# Patient Record
Sex: MALE | Race: BLACK OR AFRICAN AMERICAN | NOT HISPANIC OR LATINO | Employment: FULL TIME | URBAN - METROPOLITAN AREA
[De-identification: names, ages, dates, MRNs, and addresses within clinical notes are randomized per-mention and may not be internally consistent; named-entity substitution may affect disease eponyms.]

---

## 2019-10-23 ENCOUNTER — OFFICE VISIT (OUTPATIENT)
Dept: GASTROENTEROLOGY | Facility: CLINIC | Age: 44
End: 2019-10-23

## 2019-10-23 ENCOUNTER — LAB (OUTPATIENT)
Dept: LAB | Facility: HOSPITAL | Age: 44
End: 2019-10-23

## 2019-10-23 VITALS
HEIGHT: 72 IN | WEIGHT: 309 LBS | RESPIRATION RATE: 16 BRPM | HEART RATE: 92 BPM | OXYGEN SATURATION: 98 % | SYSTOLIC BLOOD PRESSURE: 128 MMHG | BODY MASS INDEX: 41.85 KG/M2 | DIASTOLIC BLOOD PRESSURE: 92 MMHG | TEMPERATURE: 97.4 F

## 2019-10-23 DIAGNOSIS — Z12.11 SCREENING FOR COLON CANCER: Primary | ICD-10-CM

## 2019-10-23 DIAGNOSIS — R19.4 CHANGE IN BOWEL HABIT: Primary | ICD-10-CM

## 2019-10-23 DIAGNOSIS — R19.5 MUCUS IN STOOL: ICD-10-CM

## 2019-10-23 DIAGNOSIS — E66.01 OBESITY, CLASS III, BMI 40-49.9 (MORBID OBESITY) (HCC): ICD-10-CM

## 2019-10-23 DIAGNOSIS — R10.9 ABDOMINAL CRAMPING: ICD-10-CM

## 2019-10-23 DIAGNOSIS — K90.9 STEATORRHEA: ICD-10-CM

## 2019-10-23 DIAGNOSIS — K51.918 ULCERATIVE COLITIS WITH OTHER COMPLICATION, UNSPECIFIED LOCATION (HCC): ICD-10-CM

## 2019-10-23 LAB
ALBUMIN SERPL-MCNC: 4.6 G/DL (ref 3.5–5.2)
ALBUMIN/GLOB SERPL: 1.4 G/DL
ALP SERPL-CCNC: 64 U/L (ref 39–117)
ALT SERPL W P-5'-P-CCNC: 39 U/L (ref 1–41)
ANION GAP SERPL CALCULATED.3IONS-SCNC: 11 MMOL/L (ref 5–15)
AST SERPL-CCNC: 24 U/L (ref 1–40)
BASOPHILS # BLD AUTO: 0.07 10*3/MM3 (ref 0–0.2)
BASOPHILS NFR BLD AUTO: 0.9 % (ref 0–1.5)
BILIRUB SERPL-MCNC: 0.4 MG/DL (ref 0.2–1.2)
BUN BLD-MCNC: 12 MG/DL (ref 6–20)
BUN/CREAT SERPL: 13.3 (ref 7–25)
CALCIUM SPEC-SCNC: 9.2 MG/DL (ref 8.6–10.5)
CHLORIDE SERPL-SCNC: 101 MMOL/L (ref 98–107)
CO2 SERPL-SCNC: 24 MMOL/L (ref 22–29)
CREAT BLD-MCNC: 0.9 MG/DL (ref 0.76–1.27)
CRP SERPL-MCNC: 0.08 MG/DL (ref 0–0.5)
DEPRECATED RDW RBC AUTO: 41.9 FL (ref 37–54)
EOSINOPHIL # BLD AUTO: 0.47 10*3/MM3 (ref 0–0.4)
EOSINOPHIL NFR BLD AUTO: 5.9 % (ref 0.3–6.2)
ERYTHROCYTE [DISTWIDTH] IN BLOOD BY AUTOMATED COUNT: 13.1 % (ref 12.3–15.4)
FERRITIN SERPL-MCNC: 107 NG/ML (ref 30–400)
GFR SERPL CREATININE-BSD FRML MDRD: 110 ML/MIN/1.73
GLOBULIN UR ELPH-MCNC: 3.3 GM/DL
GLUCOSE BLD-MCNC: 139 MG/DL (ref 65–99)
HCT VFR BLD AUTO: 49.2 % (ref 37.5–51)
HGB BLD-MCNC: 16.2 G/DL (ref 13–17.7)
IMM GRANULOCYTES # BLD AUTO: 0.04 10*3/MM3 (ref 0–0.05)
IMM GRANULOCYTES NFR BLD AUTO: 0.5 % (ref 0–0.5)
IRON 24H UR-MRATE: 81 MCG/DL (ref 59–158)
IRON SATN MFR SERPL: 18 % (ref 20–50)
LYMPHOCYTES # BLD AUTO: 3.52 10*3/MM3 (ref 0.7–3.1)
LYMPHOCYTES NFR BLD AUTO: 44.6 % (ref 19.6–45.3)
MCH RBC QN AUTO: 28.8 PG (ref 26.6–33)
MCHC RBC AUTO-ENTMCNC: 32.9 G/DL (ref 31.5–35.7)
MCV RBC AUTO: 87.4 FL (ref 79–97)
MONOCYTES # BLD AUTO: 0.52 10*3/MM3 (ref 0.1–0.9)
MONOCYTES NFR BLD AUTO: 6.6 % (ref 5–12)
NEUTROPHILS # BLD AUTO: 3.28 10*3/MM3 (ref 1.7–7)
NEUTROPHILS NFR BLD AUTO: 41.5 % (ref 42.7–76)
NRBC BLD AUTO-RTO: 0 /100 WBC (ref 0–0.2)
PLATELET # BLD AUTO: 272 10*3/MM3 (ref 140–450)
PMV BLD AUTO: 10.9 FL (ref 6–12)
POTASSIUM BLD-SCNC: 4.3 MMOL/L (ref 3.5–5.2)
PROT SERPL-MCNC: 7.9 G/DL (ref 6–8.5)
RBC # BLD AUTO: 5.63 10*6/MM3 (ref 4.14–5.8)
SODIUM BLD-SCNC: 136 MMOL/L (ref 136–145)
TIBC SERPL-MCNC: 446 MCG/DL (ref 298–536)
TRANSFERRIN SERPL-MCNC: 299 MG/DL (ref 200–360)
VIT B12 BLD-MCNC: 621 PG/ML (ref 211–946)
WBC NRBC COR # BLD: 7.9 10*3/MM3 (ref 3.4–10.8)

## 2019-10-23 PROCEDURE — 80053 COMPREHEN METABOLIC PANEL: CPT

## 2019-10-23 PROCEDURE — 82652 VIT D 1 25-DIHYDROXY: CPT

## 2019-10-23 PROCEDURE — 84466 ASSAY OF TRANSFERRIN: CPT

## 2019-10-23 PROCEDURE — 86140 C-REACTIVE PROTEIN: CPT

## 2019-10-23 PROCEDURE — 82607 VITAMIN B-12: CPT

## 2019-10-23 PROCEDURE — 82728 ASSAY OF FERRITIN: CPT

## 2019-10-23 PROCEDURE — 36415 COLL VENOUS BLD VENIPUNCTURE: CPT

## 2019-10-23 PROCEDURE — 99244 OFF/OP CNSLTJ NEW/EST MOD 40: CPT | Performed by: NURSE PRACTITIONER

## 2019-10-23 PROCEDURE — 83540 ASSAY OF IRON: CPT

## 2019-10-23 PROCEDURE — 85025 COMPLETE CBC W/AUTO DIFF WBC: CPT

## 2019-10-23 NOTE — PROGRESS NOTES
"    GASTROENTEROLOGY OUTPATIENT NEW PATIENT NOTE  Patient: AXEL RAMOS : 1975  Date of Service: 10/23/2019  Dear , Paco Anderson MD   Thank you for your referral of this patient for evaluation of UC  CC: Diarrhea; Abdominal Cramping; and Ulcerative Colitis  Assessment/Plan                                             ASSESSMENT & PLANS     Change in bowel habit r/t IBD vs microscopic colitis vs infectious diarrhea vs IBS-D   Steatorrhea   Mucus in stool   Abdominal cramping  -     CBC, CMP, CRP, Iron Profile, B12, Vit D  -     Calprotectin, Fecal, Pancreatic Elastase, Fecal; CDiff PCR   -     Colonoscopy w/ Dr Phillips    Hx of ulcerative colitis per pt. Previously tx w/ PRN Asacol  Hx of epiploic appendagitis  Obesity, Class III, BMI 40-49.9 (morbid obesity) (CMS/[facility])    Follow Up:Return in about 1 month (around 2019) for Recheck.      DISCUSSION: The above plan was delineated in details with patient and all questions and concerns were answered.  Patient is also given contact information.  Patient is to return as scheduled or sooner if new problems arise  Subjective                                                     SUBJECTIVE   History of Present Illness  Mr. Axel Ramos is a 44 y.o. male, former pt of Dr Phillips's, who presents to the clinic today for an evaluation of Diarrhea; Abdominal Cramping.  Pt was last seen by Dr Phillips over 7 yrs ago in .   Hx of UC and tx w/ PRN Asacol x 1-2 wks for flare then sx would resolve. Last time of Asacol was over a year ago. When not \"flaring,\" his baseline BM about [place] 4. Dr Phillips was pt's only GI doc.  Asacol rx given by PCP.   Most recent flare started 3 days ago on .  This flare is more severe, compared to the others. Pt has lot of cramping. RLQ usually, but sometimes both lower quadrants.  +++Bowel urgency/  +++Fatty and mucusy stools. BM 5-10 times w/ [place] 6-7 both day the night time. Pt denies dark black stools or bright red blood in " the stools, in the toilet bowl, or on the toilet tissue.   He has not taken Asacol. Hx of epiploic appendagitis  Occasional NSAIDS. Gained 40 lbs w/in a yr. Admits of bad eating habit  Pt denies consuming any suspected food or unpurified water.  No one w/ whom pt has shared a meal has developed diarrhea or exposed to sick contact.    No frequent abx. Pt was not ill or hospitalized in the months leading up to diarrheal illness. No recent travel outside of the [place].   Colonoscopy, done on 8/17/12 by Dr Phillips, showed hemorrhoids, but normal colonic mucosa to the terminal ileum. Random bx WNL and no IBD    ROS:Review of Systems   Constitutional: Negative.         Pt currently or recently takes NSAIDS ( (i.e Ibuprofen, Aleve, Advil, Exedrin, BC Powder, diclofenac, meloxicam, & Naproxen, etc)?  No    Pt currently or recently takes abx  No   HENT: Negative.    Cardiovascular: Negative.    Gastrointestinal: Positive for abdominal pain (Cramping) and diarrhea.   All other systems reviewed and are negative.    Subjective   PAST MED HX: Pt  has a past medical history of Ulcerative colitis (CMS/HCC). Hx of kidney stones. No UTI  PAST SURG HX: Pt  has a past surgical history that includes Colonoscopy.  FAM HX: family history is not on file.  SOC HX: Pt  reports that he has been smoking cigars infrequently when he drinks.  He has never used smokeless tobacco. He reports that he drinks alcohol about once a month. He reports that he does not use drugs. Not .  No children  Objective                                                           OBJECTIVE   Allergy: Pt has No Known Allergies.  MEDS:Culturelle daily     RANDOM COLON BIOPSIES  8/17/12:       No significant histopathologic abnormalities.     Wt Readings from Last 5 Encounters:   10/23/19 (!) 140 kg (309 lb)   body mass index is 41.91 kg/m².,Temp: 97.4 °F (36.3 °C),BP: 128/92,Heart Rate: 92   Physical Exam  General Well developed; well nourished   American male. NAD   ENT Anicteric sclerae. Oral mucosa pink and moist without thrush or lesions    Neck Neck supple; trachea midline. No thyromegaly   Resp CTA. Respiration effort normal  CV RRR. No M/R/G. No lower extremity edema  GI Abd soft, NT, ND, normal active bowel sounds.  Morbidly obese. No abd hernia  Musc No clubbing; no cyanosis.  Gait steady  Skin No rash; no lesions; no bruises.  Skin warm to touch.  Psych Oriented to time, place, and person.  Appropriate affect    Thank you kindly for allowing me to be part of this patient’s care.    Pt care team: Daylin QUINONEZ & Lashell Garcia MA  10/23/19 8:13 AM  [facility]--Gastroenterology  [phone]    CC: , Paco Anderson MD  [facility] [address] / [facility] [address] FAX:[phone]

## 2019-10-24 ENCOUNTER — LAB REQUISITION (OUTPATIENT)
Dept: LAB | Facility: HOSPITAL | Age: 44
End: 2019-10-24

## 2019-10-24 ENCOUNTER — LAB (OUTPATIENT)
Dept: LAB | Facility: HOSPITAL | Age: 44
End: 2019-10-24

## 2019-10-24 ENCOUNTER — OUTSIDE FACILITY SERVICE (OUTPATIENT)
Dept: GASTROENTEROLOGY | Facility: CLINIC | Age: 44
End: 2019-10-24

## 2019-10-24 DIAGNOSIS — R19.7 DIARRHEA, UNSPECIFIED: ICD-10-CM

## 2019-10-24 DIAGNOSIS — R19.4 CHANGE IN BOWEL HABIT: ICD-10-CM

## 2019-10-24 LAB
C DIFF TOX GENS STL QL NAA+PROBE: NOT DETECTED
LACTOFERRIN STL QL LA: NEGATIVE
WBC STL QL MICRO: NORMAL

## 2019-10-24 PROCEDURE — 83631 LACTOFERRIN FECAL (QUANT): CPT

## 2019-10-24 PROCEDURE — 87493 C DIFF AMPLIFIED PROBE: CPT

## 2019-10-24 PROCEDURE — 88305 TISSUE EXAM BY PATHOLOGIST: CPT | Performed by: INTERNAL MEDICINE

## 2019-10-24 PROCEDURE — 45380 COLONOSCOPY AND BIOPSY: CPT | Performed by: INTERNAL MEDICINE

## 2019-10-24 PROCEDURE — 83993 ASSAY FOR CALPROTECTIN FECAL: CPT

## 2019-10-24 PROCEDURE — 82656 EL-1 FECAL QUAL/SEMIQ: CPT

## 2019-10-24 PROCEDURE — 87205 SMEAR GRAM STAIN: CPT

## 2019-10-25 LAB
1,25(OH)2D3 SERPL-MCNC: 35.7 PG/ML (ref 19.9–79.3)
CYTO UR: NORMAL
LAB AP CASE REPORT: NORMAL
LAB AP CLINICAL INFORMATION: NORMAL
PATH REPORT.FINAL DX SPEC: NORMAL
PATH REPORT.GROSS SPEC: NORMAL

## 2019-10-26 LAB — CALPROTECTIN STL-MCNT: 24 UG/G (ref 0–120)

## 2019-10-28 LAB — ELASTASE PANC STL-MCNT: >500 UG ELAST./G

## 2019-10-29 ENCOUNTER — TELEPHONE (OUTPATIENT)
Dept: GASTROENTEROLOGY | Facility: CLINIC | Age: 44
End: 2019-10-29

## 2019-11-25 ENCOUNTER — TELEPHONE (OUTPATIENT)
Dept: GASTROENTEROLOGY | Facility: CLINIC | Age: 44
End: 2019-11-25